# Patient Record
Sex: FEMALE | NOT HISPANIC OR LATINO | URBAN - METROPOLITAN AREA
[De-identification: names, ages, dates, MRNs, and addresses within clinical notes are randomized per-mention and may not be internally consistent; named-entity substitution may affect disease eponyms.]

---

## 2019-10-02 ENCOUNTER — IMPORTED ENCOUNTER (OUTPATIENT)
Dept: URBAN - METROPOLITAN AREA CLINIC 1 | Facility: CLINIC | Age: 69
End: 2019-10-02

## 2019-10-02 PROBLEM — H52.4: Noted: 2019-10-02

## 2019-10-02 PROBLEM — H52.223: Noted: 2019-10-02

## 2019-10-02 PROBLEM — H52.13: Noted: 2019-10-02

## 2019-10-02 PROCEDURE — S0620 ROUTINE OPHTHALMOLOGICAL EXA: HCPCS

## 2019-10-02 NOTE — PATIENT DISCUSSION
1. Myopia / Astigmatism / Presbyopia OU -- Finalized Glasses MRx was given to patient today for corrective spectacles if indicated. All conditions discussed with patient today. 2.  Cataracts OU -- Observe. 3. Dry Eyes w/ PEK OU -- Recommend the frequent use of OTC AT's BID-QID OU Routinely (Sample of Refresh Relieva Given). Return for an appointment in 1 YR for a 36 OU with Dr. Bernardo Carlisle. Return for an appointment in 6 MO for a 30 OU with Dr. Bernardo Carlisle.

## 2020-06-10 ENCOUNTER — IMPORTED ENCOUNTER (OUTPATIENT)
Dept: URBAN - METROPOLITAN AREA CLINIC 1 | Facility: CLINIC | Age: 70
End: 2020-06-10

## 2020-06-10 PROBLEM — H04.123: Noted: 2020-06-10

## 2020-06-10 PROBLEM — H16.143: Noted: 2020-06-10

## 2020-06-10 PROBLEM — H25.813: Noted: 2020-06-10

## 2020-06-10 PROCEDURE — 92014 COMPRE OPH EXAM EST PT 1/>: CPT

## 2020-06-10 NOTE — PATIENT DISCUSSION
1.  Cataract OU -- Observe for now without intervention. The patient was advised to contact us if any change or worsening of vision2. ANA MARIA w/ PEK OU OD>OS -- Non-compliance with ATs. Recommend the use of ATs TID OU routinely. Patient defers MRx today. Will return under vision plan. Return for an appointment in 1 year for a 30/glare with Dr. Aquiles Velazco. Return for an appointment in October for a 36 with Dr. Aquiles Velazco.

## 2020-12-22 ENCOUNTER — IMPORTED ENCOUNTER (OUTPATIENT)
Dept: URBAN - METROPOLITAN AREA CLINIC 1 | Facility: CLINIC | Age: 70
End: 2020-12-22

## 2020-12-22 PROBLEM — H52.13: Noted: 2020-12-22

## 2020-12-22 PROBLEM — H52.4: Noted: 2020-12-22

## 2020-12-22 PROBLEM — H52.223: Noted: 2020-12-22

## 2020-12-22 PROCEDURE — S0621 ROUTINE OPHTHALMOLOGICAL EXA: HCPCS

## 2020-12-22 NOTE — PATIENT DISCUSSION
1. Myopia w/ Astigmatism OU -- Rx was given for correction if indicated and requested. 2. Presbyopia 3. ANA MARIA w/ PEK OU -- Cont the use of ATs TID-QID OU routinely. Urged and stressed compliance (Sample of Blink Given). 4.  Nuclear Cataracts OU -- Observe. Return for an appointment in 6 months 27 with Dr. Miriam Goddard.

## 2021-06-15 ENCOUNTER — IMPORTED ENCOUNTER (OUTPATIENT)
Dept: URBAN - METROPOLITAN AREA CLINIC 1 | Facility: CLINIC | Age: 71
End: 2021-06-15

## 2021-06-15 PROBLEM — H43.813: Noted: 2021-06-15

## 2021-06-15 PROBLEM — H25.813: Noted: 2021-06-15

## 2021-06-15 PROBLEM — H16.143: Noted: 2021-06-15

## 2021-06-15 PROBLEM — H04.123: Noted: 2021-06-15

## 2021-06-15 PROCEDURE — 99214 OFFICE O/P EST MOD 30 MIN: CPT

## 2021-06-15 NOTE — PATIENT DISCUSSION
1.  Cataract OU -- Observe for now without intervention. The patient was advised to contact us if any change or worsening of vision2. ANA MARIA w/ PEK OU -- Stressed ATs BID-QID OU routinely. 3.  PVD OU (*new OU*) -- RD precautions. Patient deferred Manifest Rx today. Return for an appointment in 6 month 36 with Dr. Olimpia Kelley.

## 2021-12-15 ENCOUNTER — IMPORTED ENCOUNTER (OUTPATIENT)
Dept: URBAN - METROPOLITAN AREA CLINIC 1 | Facility: CLINIC | Age: 71
End: 2021-12-15

## 2021-12-15 PROBLEM — H52.4: Noted: 2021-12-15

## 2021-12-15 PROBLEM — H52.223: Noted: 2021-12-15

## 2021-12-15 PROBLEM — H52.13: Noted: 2021-12-15

## 2021-12-15 PROCEDURE — S0621 ROUTINE OPHTHALMOLOGICAL EXA: HCPCS

## 2021-12-15 NOTE — PATIENT DISCUSSION
1. Myopia w/ Astigmatism OU -- Rx was given for correction if indicated and requested. 2. Presbyopia 3. Cataract OU -- Observe. 4. ANA MARIA w/ PEK OU -- Recommend ATs BID-QID OU routinely. 5.  PVD OU -- RD precautions. Return for an appointment in 6 months 27 with Dr. Leroy Estrada. Return for an appointment in 1 year 36 with Dr. Leroy Estrada.

## 2022-04-02 ASSESSMENT — TONOMETRY
OD_IOP_MMHG: 13
OS_IOP_MMHG: 13
OS_IOP_MMHG: 12
OS_IOP_MMHG: 12
OS_IOP_MMHG: 10
OD_IOP_MMHG: 12
OD_IOP_MMHG: 11
OS_IOP_MMHG: 12
OD_IOP_MMHG: 12
OD_IOP_MMHG: 12

## 2022-04-02 ASSESSMENT — VISUAL ACUITY
OS_SC: 20/30
OS_SC: 20/20
OS_SC: 20/20
OS_CC: J1
OS_SC: 20/25+2
OD_CC: J1
OD_SC: 20/25
OD_CC: J1+
OS_SC: 20/25
OD_SC: 20/20
OD_SC: 20/20
OS_CC: J1+
OD_SC: 20/30
OD_SC: 20/20

## 2022-06-22 ENCOUNTER — ESTABLISHED PATIENT (OUTPATIENT)
Dept: URBAN - METROPOLITAN AREA CLINIC 1 | Facility: CLINIC | Age: 72
End: 2022-06-22

## 2022-06-22 DIAGNOSIS — H16.143: ICD-10-CM

## 2022-06-22 DIAGNOSIS — H04.123: ICD-10-CM

## 2022-06-22 DIAGNOSIS — H43.812: ICD-10-CM

## 2022-06-22 DIAGNOSIS — H25.813: ICD-10-CM

## 2022-06-22 PROCEDURE — 99214 OFFICE O/P EST MOD 30 MIN: CPT

## 2022-06-22 ASSESSMENT — VISUAL ACUITY
OD_CC: J1+
OD_CC: 20/20
OS_CC: J1+
OS_CC: 20/25+

## 2022-06-22 ASSESSMENT — TONOMETRY
OS_IOP_MMHG: 12
OD_IOP_MMHG: 12

## 2023-01-04 ENCOUNTER — COMPREHENSIVE EXAM (OUTPATIENT)
Dept: URBAN - METROPOLITAN AREA CLINIC 1 | Facility: CLINIC | Age: 73
End: 2023-01-04

## 2023-01-04 DIAGNOSIS — Z01.00: ICD-10-CM

## 2023-01-04 PROCEDURE — 92014 COMPRE OPH EXAM EST PT 1/>: CPT

## 2023-01-04 PROCEDURE — 92015 DETERMINE REFRACTIVE STATE: CPT

## 2023-01-04 ASSESSMENT — TONOMETRY
OS_IOP_MMHG: 11
OD_IOP_MMHG: 11

## 2023-01-04 ASSESSMENT — VISUAL ACUITY
OD_SC: 20/25
OD_CC: 20/20
OS_CC: 20/40-2
OS_SC: 20/20

## 2023-07-12 ENCOUNTER — COMPREHENSIVE EXAM (OUTPATIENT)
Dept: URBAN - METROPOLITAN AREA CLINIC 1 | Facility: CLINIC | Age: 73
End: 2023-07-12

## 2023-07-12 DIAGNOSIS — H25.813: ICD-10-CM

## 2023-07-12 DIAGNOSIS — H16.143: ICD-10-CM

## 2023-07-12 DIAGNOSIS — H04.123: ICD-10-CM

## 2023-07-12 DIAGNOSIS — H43.813: ICD-10-CM

## 2023-07-12 PROCEDURE — 92014 COMPRE OPH EXAM EST PT 1/>: CPT

## 2023-07-12 ASSESSMENT — VISUAL ACUITY
OS_CC: 20/20-1
OD_CC: 20/20
OS_BAT: 20/25
OD_CC: J1+
OS_CC: J1+
OD_BAT: 20/25

## 2023-07-12 ASSESSMENT — TONOMETRY
OS_IOP_MMHG: 11
OD_IOP_MMHG: 12

## 2024-01-17 ENCOUNTER — COMPREHENSIVE EXAM (OUTPATIENT)
Dept: URBAN - METROPOLITAN AREA CLINIC 1 | Facility: CLINIC | Age: 74
End: 2024-01-17

## 2024-01-17 DIAGNOSIS — Z01.00: ICD-10-CM

## 2024-01-17 PROCEDURE — 92015 DETERMINE REFRACTIVE STATE: CPT

## 2024-01-17 PROCEDURE — 92014 COMPRE OPH EXAM EST PT 1/>: CPT

## 2024-01-17 ASSESSMENT — TONOMETRY
OS_IOP_MMHG: 14
OD_IOP_MMHG: 14

## 2024-01-17 ASSESSMENT — VISUAL ACUITY
OD_CC: J1+
OS_CC: 20/20-2
OS_BAT: 20/25
OD_BAT: 20/25
OS_CC: J1+
OD_CC: 20/20

## 2024-07-18 ENCOUNTER — COMPREHENSIVE EXAM (OUTPATIENT)
Dept: URBAN - METROPOLITAN AREA CLINIC 1 | Facility: CLINIC | Age: 74
End: 2024-07-18

## 2024-07-18 DIAGNOSIS — H04.123: ICD-10-CM

## 2024-07-18 DIAGNOSIS — H25.813: ICD-10-CM

## 2024-07-18 DIAGNOSIS — H16.143: ICD-10-CM

## 2024-07-18 DIAGNOSIS — H43.813: ICD-10-CM

## 2024-07-18 PROCEDURE — 92015 DETERMINE REFRACTIVE STATE: CPT

## 2024-07-18 PROCEDURE — 99214 OFFICE O/P EST MOD 30 MIN: CPT

## 2024-07-18 ASSESSMENT — TONOMETRY
OD_IOP_MMHG: 14
OS_IOP_MMHG: 12

## 2024-07-18 ASSESSMENT — VISUAL ACUITY
OD_CC: 20/20-2
OS_CC: 20/20
OD_BAT: 20/25
OS_BAT: 20/25
OS_CC: J2
OD_CC: J2

## 2025-01-21 ENCOUNTER — COMPREHENSIVE EXAM (OUTPATIENT)
Age: 75
End: 2025-01-21

## 2025-01-21 DIAGNOSIS — H52.221: ICD-10-CM

## 2025-01-21 DIAGNOSIS — H52.4: ICD-10-CM

## 2025-01-21 DIAGNOSIS — Z01.00: ICD-10-CM

## 2025-01-21 DIAGNOSIS — H52.12: ICD-10-CM

## 2025-01-21 PROCEDURE — 92015 DETERMINE REFRACTIVE STATE: CPT

## 2025-01-21 PROCEDURE — 92014 COMPRE OPH EXAM EST PT 1/>: CPT

## 2025-07-18 ENCOUNTER — COMPREHENSIVE EXAM (OUTPATIENT)
Age: 75
End: 2025-07-18

## 2025-07-18 DIAGNOSIS — H25.813: ICD-10-CM

## 2025-07-18 DIAGNOSIS — H04.123: ICD-10-CM

## 2025-07-18 DIAGNOSIS — H16.143: ICD-10-CM

## 2025-07-18 DIAGNOSIS — H43.813: ICD-10-CM

## 2025-07-18 PROCEDURE — 99214 OFFICE O/P EST MOD 30 MIN: CPT
